# Patient Record
Sex: MALE | Race: WHITE
[De-identification: names, ages, dates, MRNs, and addresses within clinical notes are randomized per-mention and may not be internally consistent; named-entity substitution may affect disease eponyms.]

---

## 2017-02-02 ENCOUNTER — APPOINTMENT (OUTPATIENT)
Dept: CARDIOLOGY | Facility: CLINIC | Age: 57
End: 2017-02-02

## 2017-05-16 ENCOUNTER — APPOINTMENT (OUTPATIENT)
Dept: CARDIOLOGY | Facility: CLINIC | Age: 57
End: 2017-05-16

## 2017-06-01 ENCOUNTER — APPOINTMENT (OUTPATIENT)
Dept: CARDIOLOGY | Facility: CLINIC | Age: 57
End: 2017-06-01

## 2017-10-24 ENCOUNTER — RECORD ABSTRACTING (OUTPATIENT)
Age: 57
End: 2017-10-24

## 2017-10-24 DIAGNOSIS — Z98.890 OTHER SPECIFIED POSTPROCEDURAL STATES: ICD-10-CM

## 2017-10-24 DIAGNOSIS — Z82.49 FAMILY HISTORY OF ISCHEMIC HEART DISEASE AND OTHER DISEASES OF THE CIRCULATORY SYSTEM: ICD-10-CM

## 2017-10-24 DIAGNOSIS — Z78.9 OTHER SPECIFIED HEALTH STATUS: ICD-10-CM

## 2017-10-24 RX ORDER — ASPIRIN 325 MG/1
325 TABLET, FILM COATED ORAL DAILY
Refills: 0 | Status: ACTIVE | COMMUNITY

## 2017-10-24 RX ORDER — OLANZAPINE 5 MG/1
5 TABLET ORAL DAILY
Refills: 0 | Status: ACTIVE | COMMUNITY

## 2017-10-24 RX ORDER — SERTRALINE HYDROCHLORIDE 100 MG/1
100 TABLET, FILM COATED ORAL DAILY
Refills: 0 | Status: ACTIVE | COMMUNITY

## 2017-10-24 RX ORDER — METFORMIN HYDROCHLORIDE 500 MG/1
500 TABLET, FILM COATED, EXTENDED RELEASE ORAL
Refills: 0 | Status: ACTIVE | COMMUNITY

## 2017-12-07 ENCOUNTER — APPOINTMENT (OUTPATIENT)
Dept: CARDIOLOGY | Facility: CLINIC | Age: 57
End: 2017-12-07
Payer: MEDICARE

## 2017-12-07 VITALS
WEIGHT: 211 LBS | OXYGEN SATURATION: 95 % | HEIGHT: 70 IN | HEART RATE: 62 BPM | BODY MASS INDEX: 30.21 KG/M2 | DIASTOLIC BLOOD PRESSURE: 68 MMHG | SYSTOLIC BLOOD PRESSURE: 124 MMHG

## 2017-12-07 PROCEDURE — 99214 OFFICE O/P EST MOD 30 MIN: CPT

## 2017-12-07 RX ORDER — METOPROLOL SUCCINATE 25 MG/1
25 TABLET, EXTENDED RELEASE ORAL
Qty: 45 | Refills: 0 | Status: DISCONTINUED | COMMUNITY
Start: 2017-01-03 | End: 2017-12-07

## 2018-06-21 ENCOUNTER — APPOINTMENT (OUTPATIENT)
Dept: CARDIOLOGY | Facility: CLINIC | Age: 58
End: 2018-06-21
Payer: MEDICARE

## 2018-06-21 ENCOUNTER — NON-APPOINTMENT (OUTPATIENT)
Age: 58
End: 2018-06-21

## 2018-06-21 VITALS
WEIGHT: 208 LBS | SYSTOLIC BLOOD PRESSURE: 108 MMHG | HEART RATE: 57 BPM | DIASTOLIC BLOOD PRESSURE: 60 MMHG | BODY MASS INDEX: 29.78 KG/M2 | HEIGHT: 70 IN

## 2018-06-21 PROCEDURE — 99214 OFFICE O/P EST MOD 30 MIN: CPT

## 2018-06-21 RX ORDER — METFORMIN ER 500 MG 500 MG/1
500 TABLET ORAL
Qty: 180 | Refills: 0 | Status: DISCONTINUED | COMMUNITY
Start: 2017-02-21 | End: 2018-06-21

## 2018-08-01 ENCOUNTER — APPOINTMENT (OUTPATIENT)
Dept: CARDIOLOGY | Facility: CLINIC | Age: 58
End: 2018-08-01
Payer: MEDICARE

## 2018-08-01 PROCEDURE — 93979 VASCULAR STUDY: CPT

## 2018-08-01 PROCEDURE — 93880 EXTRACRANIAL BILAT STUDY: CPT

## 2018-08-01 PROCEDURE — A9502: CPT

## 2018-08-01 PROCEDURE — 78452 HT MUSCLE IMAGE SPECT MULT: CPT

## 2018-08-01 PROCEDURE — 93015 CV STRESS TEST SUPVJ I&R: CPT

## 2018-08-01 PROCEDURE — 93306 TTE W/DOPPLER COMPLETE: CPT

## 2018-08-15 ENCOUNTER — APPOINTMENT (OUTPATIENT)
Dept: CARDIOLOGY | Facility: CLINIC | Age: 58
End: 2018-08-15
Payer: MEDICARE

## 2018-08-15 VITALS
WEIGHT: 208 LBS | HEART RATE: 75 BPM | BODY MASS INDEX: 29.78 KG/M2 | HEIGHT: 70 IN | SYSTOLIC BLOOD PRESSURE: 126 MMHG | DIASTOLIC BLOOD PRESSURE: 78 MMHG

## 2018-08-15 PROCEDURE — 99214 OFFICE O/P EST MOD 30 MIN: CPT

## 2019-02-14 ENCOUNTER — APPOINTMENT (OUTPATIENT)
Dept: CARDIOLOGY | Facility: CLINIC | Age: 59
End: 2019-02-14
Payer: MEDICARE

## 2019-02-14 VITALS
WEIGHT: 220 LBS | HEART RATE: 68 BPM | SYSTOLIC BLOOD PRESSURE: 124 MMHG | OXYGEN SATURATION: 100 % | BODY MASS INDEX: 31.5 KG/M2 | HEIGHT: 70 IN | DIASTOLIC BLOOD PRESSURE: 78 MMHG

## 2019-02-14 PROCEDURE — 99214 OFFICE O/P EST MOD 30 MIN: CPT

## 2019-02-14 NOTE — REASON FOR VISIT
[Follow-Up - Clinic] : a clinic follow-up of [FreeTextEntry2] : CAD, 4vCABG 2013, HTN, dyslipidemia, TBI craniotomy 1996

## 2019-02-14 NOTE — HISTORY OF PRESENT ILLNESS
[FreeTextEntry1] : Brice is a 58-year-old male with history of hypertension, dyslipidemia, type 2 diabetes, angina, CAD 4vCABG 2013, traumatic brain injury craniotomy 1996.\par \par Cardiovascular review of symptoms is negative for exertional chest pain, dyspnea, palpitations, dizziness or syncope. No PND or orthopnea leg edema. No bleeding or black stool.\par \par Brice is physically active walking  30 minutes without exertional chest pain\par \par Exercise Myoview stress test August 2008 LV of 62%, normal perfusion, nonischemic EKG response, no angina, 82% MPHR, 10 minutes 40 seconds Nagi protocol\par \par 2-D echo August 2018, LVEF 55%, moderate diastolic dysfunction, mild MR and TR, normal PASP\par \par Carotid and abdominal ultrasound August 2018, nonobstructive plaque, normal abdominal aortic size\par \par Nov 2010 patient had bradycardia metoprolol was discontinued and low dose Ramipril was continued. Followup BP was 92/60 and Ramipril with be discontinued. Home BP are at goal <130/90. \par \par 2-D echo May 2017 LVEF 60%, mild diastolic dysfunction, trace MR and PI, normal PASP\par \par Carotid and abdominal ultrasound May 2017 nonobstructive, normal aortic size\par \par Stress echo 8-18-15 11:00 88%, negative for exercise induced ischemia, septal motion c/w CABG, PASP wnl\par \par 2DEcho January 2016 suboptimal study LVEF 60%, trace mild MR and TR. \par \par Lab 2/1/2017, CBC and BMP normal range, TSH LFT normal range, total cholesterol 131, TG 70, HDL 54, LDL 63\par

## 2019-02-14 NOTE — PHYSICAL EXAM
[General Appearance - Well Developed] : well developed [Normal Appearance] : normal appearance [Well Groomed] : well groomed [General Appearance - Well Nourished] : well nourished [No Deformities] : no deformities [General Appearance - In No Acute Distress] : no acute distress [Normal Conjunctiva] : the conjunctiva exhibited no abnormalities [Eyelids - No Xanthelasma] : the eyelids demonstrated no xanthelasmas [Normal Oral Mucosa] : normal oral mucosa [No Oral Pallor] : no oral pallor [No Oral Cyanosis] : no oral cyanosis [Normal Jugular Venous A Waves Present] : normal jugular venous A waves present [Normal Jugular Venous V Waves Present] : normal jugular venous V waves present [No Jugular Venous Romo A Waves] : no jugular venous romo A waves [Respiration, Rhythm And Depth] : normal respiratory rhythm and effort [Exaggerated Use Of Accessory Muscles For Inspiration] : no accessory muscle use [Auscultation Breath Sounds / Voice Sounds] : lungs were clear to auscultation bilaterally [Heart Rate And Rhythm] : heart rate and rhythm were normal [Heart Sounds] : normal S1 and S2 [Murmurs] : no murmurs present [Abdomen Soft] : soft [Abdomen Tenderness] : non-tender [Abdomen Mass (___ Cm)] : no abdominal mass palpated [Abnormal Walk] : normal gait [Gait - Sufficient For Exercise Testing] : the gait was sufficient for exercise testing [Nail Clubbing] : no clubbing of the fingernails [Cyanosis, Localized] : no localized cyanosis [Petechial Hemorrhages (___cm)] : no petechial hemorrhages [] : no ischemic changes [Oriented To Time, Place, And Person] : oriented to person, place, and time [Affect] : the affect was normal [Mood] : the mood was normal [No Anxiety] : not feeling anxious

## 2019-02-14 NOTE — DISCUSSION/SUMMARY
[FreeTextEntry1] : Brice is a 58-year-old male  with medical history detailed above and active medical issues including:\par \par - Dyspnea on exertion, normal perfusion MPI stress test normal LVEF August 2018\par \par -CAD 4vCABG 2013 stable on aspirin\par \par -Hypertension BP at goal <130/80 off all BP meds for hypotnesion/bradycardia\par \par -Dyslipidemia on Lipitor well tolerated\par \par -Type 2 diabetes on metformin\par \par -Chronic stable angina\par \par -Tramatic brain injury craniotomy 1996\par \par Advised patient to follow active lifestyle with regular cardiovascular exercise. Patient educated on lifestyle and diet modification with antidiabetic, low sodium low fat diet and avoidance of excessive alcohol. Patient is aware to call with any symptoms or concerns. \par \par Patient will be seen in cardiology follow-up 6 months. Patient will have 2-D echocardiogram to assess for  LV systolic function, wall motion and  structural heart disease. Current cardiac medications remain unchanged. Repeat labs will be ordered with PMD.\par \par Brice will follow up with Dr Estrella Trujillo for primary care.\par \par \par

## 2019-08-06 ENCOUNTER — APPOINTMENT (OUTPATIENT)
Dept: CARDIOLOGY | Facility: CLINIC | Age: 59
End: 2019-08-06
Payer: MEDICARE

## 2019-08-06 PROCEDURE — 93306 TTE W/DOPPLER COMPLETE: CPT

## 2019-08-15 ENCOUNTER — NON-APPOINTMENT (OUTPATIENT)
Age: 59
End: 2019-08-15

## 2019-08-15 ENCOUNTER — APPOINTMENT (OUTPATIENT)
Age: 59
End: 2019-08-15
Payer: MEDICARE

## 2019-08-15 VITALS
HEIGHT: 70 IN | BODY MASS INDEX: 31.5 KG/M2 | SYSTOLIC BLOOD PRESSURE: 120 MMHG | WEIGHT: 220 LBS | DIASTOLIC BLOOD PRESSURE: 62 MMHG | HEART RATE: 65 BPM | OXYGEN SATURATION: 98 %

## 2019-08-15 PROCEDURE — 93000 ELECTROCARDIOGRAM COMPLETE: CPT

## 2019-08-15 PROCEDURE — 99214 OFFICE O/P EST MOD 30 MIN: CPT

## 2019-08-15 NOTE — DISCUSSION/SUMMARY
[FreeTextEntry1] : Brice is a 58-year-old male  with medical history detailed above and active medical issues including:\par \par - Dyspnea on exertion, normal perfusion MPI stress test normal LVEF August 2018\par \par -CAD 4vCABG 2013 stable on aspirin\par \par -Hypertension BP at goal <130/80 off all BP meds for hypotnesion/bradycardia\par \par -Dyslipidemia on Lipitor well tolerated\par \par -Type 2 diabetes on metformin\par \par -Chronic stable angina\par \par -Tramatic brain injury craniotomy 1996\par \par Advised patient to follow active lifestyle with regular cardiovascular exercise. Patient educated on lifestyle and diet modification with antidiabetic, low sodium low fat diet and avoidance of excessive alcohol. Patient is aware to call with any symptoms or concerns. \par \par Patient will be seen in cardiology follow-up 6 months.  Current cardiac medications remain unchanged. Repeat labs will be ordered with PMD.\par \par Brice will follow up with Dr Estrella Trujillo for primary care.\par \par \par

## 2019-08-15 NOTE — HISTORY OF PRESENT ILLNESS
[FreeTextEntry1] : Brice is a 58-year-old male with history of hypertension, dyslipidemia, type 2 diabetes, angina, CAD 4vCABG 2013, traumatic brain injury craniotomy 1996.\par \par Cardiovascular review of symptoms is negative for exertional chest pain, dyspnea, palpitations, dizziness or syncope. No PND or orthopnea leg edema. No bleeding or black stool.\par \par Brice is physically active walking  30 minutes without exertional chest pain\par \par Exercise Myoview stress test August 2008 LV of 62%, normal perfusion, nonischemic EKG response, no angina, 82% MPHR, 10 minutes 40 seconds Nagi protocol\par \par 2-D echo August 2018, LVEF 55%, moderate diastolic dysfunction, mild MR and TR, normal PASP\par \par Carotid and abdominal ultrasound August 2018, nonobstructive plaque, normal abdominal aortic size\par \par Nov 2010 patient had bradycardia metoprolol was discontinued and low dose Ramipril was continued. Followup BP was 92/60 and Ramipril with be discontinued. Home BP are at goal <130/90. \par \par Echocardiogram August 2019, LVEF 65%, normal diastolic function, mild MR, AI and PI, normal RVSP\par \par 2-D echo May 2017 LVEF 60%, mild diastolic dysfunction, trace MR and PI, normal PASP\par \par Carotid and abdominal ultrasound May 2017 nonobstructive, normal aortic size\par \par Stress echo 8-18-15 11:00 88%, negative for exercise induced ischemia, septal motion c/w CABG, PASP wnl\par \par 2DEcho January 2016 suboptimal study LVEF 60%, trace mild MR and TR. \par \par Lab 2/1/2017, CBC and BMP normal range, TSH LFT normal range, total cholesterol 131, TG 70, HDL 54, LDL 63\par

## 2019-08-15 NOTE — PHYSICAL EXAM
[General Appearance - Well Developed] : well developed [Normal Appearance] : normal appearance [Well Groomed] : well groomed [General Appearance - Well Nourished] : well nourished [No Deformities] : no deformities [General Appearance - In No Acute Distress] : no acute distress [Normal Conjunctiva] : the conjunctiva exhibited no abnormalities [Eyelids - No Xanthelasma] : the eyelids demonstrated no xanthelasmas [Normal Oral Mucosa] : normal oral mucosa [No Oral Pallor] : no oral pallor [No Oral Cyanosis] : no oral cyanosis [Normal Jugular Venous A Waves Present] : normal jugular venous A waves present [Normal Jugular Venous V Waves Present] : normal jugular venous V waves present [No Jugular Venous Romo A Waves] : no jugular venous romo A waves [Respiration, Rhythm And Depth] : normal respiratory rhythm and effort [Exaggerated Use Of Accessory Muscles For Inspiration] : no accessory muscle use [Heart Rate And Rhythm] : heart rate and rhythm were normal [Auscultation Breath Sounds / Voice Sounds] : lungs were clear to auscultation bilaterally [Heart Sounds] : normal S1 and S2 [Murmurs] : no murmurs present [Abdomen Soft] : soft [Abdomen Tenderness] : non-tender [Abdomen Mass (___ Cm)] : no abdominal mass palpated [Abnormal Walk] : normal gait [Gait - Sufficient For Exercise Testing] : the gait was sufficient for exercise testing [Nail Clubbing] : no clubbing of the fingernails [Cyanosis, Localized] : no localized cyanosis [Petechial Hemorrhages (___cm)] : no petechial hemorrhages [] : no ischemic changes [Oriented To Time, Place, And Person] : oriented to person, place, and time [Affect] : the affect was normal [Mood] : the mood was normal [No Anxiety] : not feeling anxious

## 2019-09-30 ENCOUNTER — RX RENEWAL (OUTPATIENT)
Age: 59
End: 2019-09-30

## 2020-02-12 ENCOUNTER — APPOINTMENT (OUTPATIENT)
Dept: CARDIOLOGY | Facility: CLINIC | Age: 60
End: 2020-02-12
Payer: MEDICARE

## 2020-02-12 VITALS
HEIGHT: 70 IN | BODY MASS INDEX: 30.78 KG/M2 | HEART RATE: 59 BPM | OXYGEN SATURATION: 98 % | DIASTOLIC BLOOD PRESSURE: 70 MMHG | WEIGHT: 215 LBS | SYSTOLIC BLOOD PRESSURE: 120 MMHG

## 2020-02-12 PROCEDURE — 99214 OFFICE O/P EST MOD 30 MIN: CPT

## 2020-02-12 NOTE — DISCUSSION/SUMMARY
[FreeTextEntry1] : Brice is a 59-year-old male  with medical history detailed above and active medical issues including:\par \par - Dyspnea on exertion, normal perfusion MPI stress test normal LVEF August 2018\par \par -CAD 4vCABG 2013 stable on aspirin\par \par -Hypertension BP at goal <130/80 off all BP meds for hypotnesion/bradycardia\par \par -Dyslipidemia on Lipitor well tolerated\par \par -Type 2 diabetes on metformin\par \par -Chronic stable angina\par \par -Tramatic brain injury craniotomy 1996\par \par Advised patient to follow active lifestyle with regular cardiovascular exercise. Patient educated on lifestyle and diet modification with antidiabetic, low sodium low fat diet and avoidance of excessive alcohol. Patient is aware to call with any symptoms or concerns. \par \par Patient will be seen in cardiology follow-up 6 months. Patient will have 2-D echocardiogram to assess for  LV systolic function, wall motion and  structural heart disease.  Carotid and abdominal ultrasound to assess for obstructive PAD.   Current cardiac medications remain unchanged. Repeat labs will be ordered with PMD.\par \par Brice will follow up with Dr Estrella Trujillo for primary care.\par \par \par

## 2020-02-12 NOTE — HISTORY OF PRESENT ILLNESS
[FreeTextEntry1] : Brice is a 59-year-old male with history of hypertension, dyslipidemia, type 2 diabetes, angina, CAD 4vCABG 2013, traumatic brain injury craniotomy 1996.\par \par Cardiovascular review of symptoms is negative for exertional chest pain, dyspnea, palpitations, dizziness or syncope. No PND or orthopnea leg edema. No bleeding or black stool.\par \par Brice is physically active walking  30 minutes without exertional chest pain\par \par Exercise Myoview stress test August 2008 LV of 62%, normal perfusion, nonischemic EKG response, no angina, 82% MPHR, 10 minutes 40 seconds Nagi protocol\par \par 2-D echo August 2018, LVEF 55%, moderate diastolic dysfunction, mild MR and TR, normal PASP\par \par Carotid and abdominal ultrasound August 2018, nonobstructive plaque, normal abdominal aortic size\par \par Nov 2010 patient had bradycardia metoprolol was discontinued and low dose Ramipril was continued. Followup BP was 92/60 and Ramipril with be discontinued. Home BP are at goal <130/90. \par \par Echocardiogram August 2019, LVEF 65%, normal diastolic function, mild MR, AI and PI, normal RVSP\par \par 2-D echo May 2017 LVEF 60%, mild diastolic dysfunction, trace MR and PI, normal PASP\par \par Carotid and abdominal ultrasound May 2017 nonobstructive, normal aortic size\par \par Stress echo 8-18-15 11:00 88%, negative for exercise induced ischemia, septal motion c/w CABG, PASP wnl\par \par 2DEcho January 2016 suboptimal study LVEF 60%, trace mild MR and TR. \par \par Lab 2/1/2017, CBC and BMP normal range, TSH LFT normal range, total cholesterol 131, TG 70, HDL 54, LDL 63\par

## 2020-02-12 NOTE — PHYSICAL EXAM
[General Appearance - Well Developed] : well developed [Normal Appearance] : normal appearance [Well Groomed] : well groomed [General Appearance - Well Nourished] : well nourished [No Deformities] : no deformities [General Appearance - In No Acute Distress] : no acute distress [Normal Conjunctiva] : the conjunctiva exhibited no abnormalities [Eyelids - No Xanthelasma] : the eyelids demonstrated no xanthelasmas [Normal Oral Mucosa] : normal oral mucosa [No Oral Pallor] : no oral pallor [No Oral Cyanosis] : no oral cyanosis [Normal Jugular Venous A Waves Present] : normal jugular venous A waves present [Normal Jugular Venous V Waves Present] : normal jugular venous V waves present [No Jugular Venous Romo A Waves] : no jugular venous romo A waves [Respiration, Rhythm And Depth] : normal respiratory rhythm and effort [Exaggerated Use Of Accessory Muscles For Inspiration] : no accessory muscle use [Heart Rate And Rhythm] : heart rate and rhythm were normal [Auscultation Breath Sounds / Voice Sounds] : lungs were clear to auscultation bilaterally [Heart Sounds] : normal S1 and S2 [Murmurs] : no murmurs present [Abdomen Soft] : soft [Abdomen Tenderness] : non-tender [Abdomen Mass (___ Cm)] : no abdominal mass palpated [Abnormal Walk] : normal gait [Gait - Sufficient For Exercise Testing] : the gait was sufficient for exercise testing [Cyanosis, Localized] : no localized cyanosis [Nail Clubbing] : no clubbing of the fingernails [Petechial Hemorrhages (___cm)] : no petechial hemorrhages [] : no ischemic changes [Oriented To Time, Place, And Person] : oriented to person, place, and time [Affect] : the affect was normal [Mood] : the mood was normal [No Anxiety] : not feeling anxious

## 2020-09-08 ENCOUNTER — APPOINTMENT (OUTPATIENT)
Dept: CARDIOLOGY | Facility: CLINIC | Age: 60
End: 2020-09-08
Payer: MEDICARE

## 2020-09-08 PROCEDURE — 93306 TTE W/DOPPLER COMPLETE: CPT

## 2020-09-08 PROCEDURE — 93979 VASCULAR STUDY: CPT

## 2020-09-08 PROCEDURE — 93880 EXTRACRANIAL BILAT STUDY: CPT

## 2020-09-15 ENCOUNTER — APPOINTMENT (OUTPATIENT)
Dept: CARDIOLOGY | Facility: CLINIC | Age: 60
End: 2020-09-15
Payer: MEDICARE

## 2020-09-15 ENCOUNTER — NON-APPOINTMENT (OUTPATIENT)
Age: 60
End: 2020-09-15

## 2020-09-15 VITALS
TEMPERATURE: 97.3 F | BODY MASS INDEX: 32.21 KG/M2 | DIASTOLIC BLOOD PRESSURE: 74 MMHG | SYSTOLIC BLOOD PRESSURE: 126 MMHG | HEART RATE: 57 BPM | OXYGEN SATURATION: 95 % | WEIGHT: 225 LBS | HEIGHT: 70 IN

## 2020-09-15 PROCEDURE — 93000 ELECTROCARDIOGRAM COMPLETE: CPT

## 2020-09-15 PROCEDURE — 99214 OFFICE O/P EST MOD 30 MIN: CPT

## 2020-09-15 NOTE — HISTORY OF PRESENT ILLNESS
[FreeTextEntry1] : Brice is a 59-year-old male with history of hypertension, dyslipidemia, type 2 diabetes, angina, CAD 4vCABG 2013, traumatic brain injury craniotomy 1996.\par \par Cardiovascular review of symptoms is negative for exertional chest pain, dyspnea, palpitations, dizziness or syncope. No PND or orthopnea leg edema. No bleeding or black stool.\par \par Brice is physically active walking  30 minutes without exertional chest pain\par \par Echocardiogram September 2020, mild diastolic dysfunction, base MR, normal RVSP, ascending aorta 3.6 cm\par \par Carotid and abdominal ultrasound September 2020, moderate nonobstructive plaque, normal abdominal aortic size.\par \par Exercise Myoview stress test August 2008 LV of 62%, normal perfusion, nonischemic EKG response, no angina, 82% MPHR, 10 minutes 40 seconds Nagi protocol\par \par 2-D echo August 2018, LVEF 55%, moderate diastolic dysfunction, mild MR and TR, normal PASP\par \par Carotid and abdominal ultrasound August 2018, nonobstructive plaque, normal abdominal aortic size\par \par Nov 2010 patient had bradycardia metoprolol was discontinued and low dose Ramipril was continued. Followup BP was 92/60 and Ramipril with be discontinued. Home BP are at goal <130/90. \par \par Echocardiogram August 2019, LVEF 65%, normal diastolic function, mild MR, AI and PI, normal RVSP\par \par 2-D echo May 2017 LVEF 60%, mild diastolic dysfunction, trace MR and PI, normal PASP\par \par Carotid and abdominal ultrasound May 2017 nonobstructive, normal aortic size\par \par Stress echo 8-18-15 11:00 88%, negative for exercise induced ischemia, septal motion c/w CABG, PASP wnl\par \par 2DEcho January 2016 suboptimal study LVEF 60%, trace mild MR and TR. \par \par Lab 2/1/2017, CBC and BMP normal range, TSH LFT normal range, total cholesterol 131, TG 70, HDL 54, LDL 63\par

## 2020-09-15 NOTE — REASON FOR VISIT
[Follow-Up - Clinic] : a clinic follow-up of [FreeTextEntry2] : noninvasive testing for CAD, 4vCABG 2013, HTN, dyslipidemia, TBI craniotomy 1996

## 2020-09-15 NOTE — PHYSICAL EXAM
[General Appearance - Well Developed] : well developed [Well Groomed] : well groomed [Normal Appearance] : normal appearance [No Deformities] : no deformities [General Appearance - Well Nourished] : well nourished [General Appearance - In No Acute Distress] : no acute distress [Normal Conjunctiva] : the conjunctiva exhibited no abnormalities [Eyelids - No Xanthelasma] : the eyelids demonstrated no xanthelasmas [No Oral Pallor] : no oral pallor [No Oral Cyanosis] : no oral cyanosis [Normal Oral Mucosa] : normal oral mucosa [Normal Jugular Venous V Waves Present] : normal jugular venous V waves present [Normal Jugular Venous A Waves Present] : normal jugular venous A waves present [No Jugular Venous Romo A Waves] : no jugular venous romo A waves [Exaggerated Use Of Accessory Muscles For Inspiration] : no accessory muscle use [Respiration, Rhythm And Depth] : normal respiratory rhythm and effort [Auscultation Breath Sounds / Voice Sounds] : lungs were clear to auscultation bilaterally [Heart Rate And Rhythm] : heart rate and rhythm were normal [Murmurs] : no murmurs present [Heart Sounds] : normal S1 and S2 [Abdomen Tenderness] : non-tender [Abdomen Soft] : soft [Abdomen Mass (___ Cm)] : no abdominal mass palpated [Abnormal Walk] : normal gait [Gait - Sufficient For Exercise Testing] : the gait was sufficient for exercise testing [Cyanosis, Localized] : no localized cyanosis [Nail Clubbing] : no clubbing of the fingernails [Petechial Hemorrhages (___cm)] : no petechial hemorrhages [] : no ischemic changes [Affect] : the affect was normal [Oriented To Time, Place, And Person] : oriented to person, place, and time [No Anxiety] : not feeling anxious [Mood] : the mood was normal

## 2020-09-15 NOTE — DISCUSSION/SUMMARY
[FreeTextEntry1] : Brice is a 59-year-old male  with medical history detailed above and active medical issues including:\par \par - Dyspnea on exertion, normal perfusion MPI stress test normal LVEF August 2018\par \par - CAD 4vCABG 2013 stable on aspirin\par \par - Hypertension BP at goal <130/80 off all BP meds for hypotnesion/bradycardia\par \par - Dyslipidemia on Lipitor well tolerated\par \par - Type 2 diabetes on metformin\par \par - Chronic stable angina\par \par - Tramatic brain injury craniotomy 1996\par \par Advised patient to follow active lifestyle with regular cardiovascular exercise. Patient educated on lifestyle and diet modification with antidiabetic, low sodium low fat diet and avoidance of excessive alcohol. Patient is aware to call with any symptoms or concerns. \par \par Patient will be seen in cardiology follow-up 6 months.   Current cardiac medications remain unchanged. Repeat labs will be ordered with PMD.\par \par Brice will follow up with Dr Estrella Trujillo for primary care.\par \par \par

## 2021-10-14 ENCOUNTER — NON-APPOINTMENT (OUTPATIENT)
Age: 61
End: 2021-10-14

## 2021-10-14 ENCOUNTER — APPOINTMENT (OUTPATIENT)
Dept: CARDIOLOGY | Facility: CLINIC | Age: 61
End: 2021-10-14
Payer: MEDICARE

## 2021-10-14 VITALS
SYSTOLIC BLOOD PRESSURE: 124 MMHG | TEMPERATURE: 97.2 F | BODY MASS INDEX: 31.5 KG/M2 | HEIGHT: 70 IN | HEART RATE: 57 BPM | WEIGHT: 220 LBS | OXYGEN SATURATION: 97 % | DIASTOLIC BLOOD PRESSURE: 62 MMHG

## 2021-10-14 PROCEDURE — 93000 ELECTROCARDIOGRAM COMPLETE: CPT

## 2021-10-14 PROCEDURE — 99214 OFFICE O/P EST MOD 30 MIN: CPT

## 2021-10-14 NOTE — HISTORY OF PRESENT ILLNESS
[FreeTextEntry1] : Brice is a 60-year-old male with history of hypertension, dyslipidemia, type 2 diabetes, angina, CAD 4vCABG 2013, traumatic brain injury craniotomy 1996.\par \par Cardiovascular review of symptoms is negative for exertional chest pain, dyspnea, palpitations, dizziness or syncope. No PND or orthopnea leg edema. No bleeding or black stool.\par \par Brice is physically active walking  30 minutes without exertional chest pain. Patient does not wish to perform a current stress test at this time, will discuss next office visit.\par \par Echocardiogram September 2020, mild diastolic dysfunction, base MR, normal RVSP, ascending aorta 3.6 cm\par \par Carotid and abdominal ultrasound September 2020, moderate nonobstructive plaque, normal abdominal aortic size.\par \par Exercise Myoview stress test August 2008 LV of 62%, normal perfusion, nonischemic EKG response, no angina, 82% MPHR, 10 minutes 40 seconds Nagi protocol\par \par 2-D echo August 2018, LVEF 55%, moderate diastolic dysfunction, mild MR and TR, normal PASP\par \par Carotid and abdominal ultrasound August 2018, nonobstructive plaque, normal abdominal aortic size\par \par Nov 2010 patient had bradycardia metoprolol was discontinued and low dose Ramipril was continued. Followup BP was 92/60 and Ramipril with be discontinued. Home BP are at goal <130/90. \par \par Echocardiogram August 2019, LVEF 65%, normal diastolic function, mild MR, AI and PI, normal RVSP\par \par 2-D echo May 2017 LVEF 60%, mild diastolic dysfunction, trace MR and PI, normal PASP\par \par Carotid and abdominal ultrasound May 2017 nonobstructive, normal aortic size\par \par Stress echo 8-18-15 11:00 88%, negative for exercise induced ischemia, septal motion c/w CABG, PASP wnl\par \par 2DEcho January 2016 suboptimal study LVEF 60%, trace mild MR and TR. \par \par Lab 2/1/2017, CBC and BMP normal range, TSH LFT normal range, total cholesterol 131, TG 70, HDL 54, LDL 63\par

## 2021-10-14 NOTE — DISCUSSION/SUMMARY
[FreeTextEntry1] : Brice is a 60-year-old male  with medical history detailed above and active medical issues including:\par \par - No anginal symptoms, normal perfusion MPI stress test normal LVEF August 2018\par \par - CAD 4vCABG 2013 stable on aspirin\par \par - Hypertension BP average resting home BPs at guideline goal off all BP meds for hypotnesion/bradycardia\par \par - Dyslipidemia on Lipitor well tolerated\par \par - Type 2 diabetes on metformin\par \par - Chronic stable angina\par \par - Tramatic brain injury craniotomy 1996\par \par Advised patient to follow active lifestyle with regular cardiovascular exercise. Patient educated on lifestyle and diet modification with antidiabetic, low sodium low fat diet and avoidance of excessive alcohol. Patient is aware to call with any symptoms or concerns. \par \par Patient will have 2-D echocardiogram to assess LV systolic function, structural heart disease with TEB followup.  Patient will be seen in cardiology follow-up 6 months.   Current cardiac medications remain unchanged. Repeat labs will be ordered with PMD.\par \par Brice will follow up with Dr Estrella Trujillo for primary care.\par \par \par

## 2021-10-14 NOTE — PHYSICAL EXAM
[General Appearance - Well Developed] : well developed [Normal Appearance] : normal appearance [Well Groomed] : well groomed [General Appearance - Well Nourished] : well nourished [No Deformities] : no deformities [General Appearance - In No Acute Distress] : no acute distress [Eyelids - No Xanthelasma] : the eyelids demonstrated no xanthelasmas [Normal Oral Mucosa] : normal oral mucosa [No Oral Pallor] : no oral pallor [No Oral Cyanosis] : no oral cyanosis [Normal Jugular Venous A Waves Present] : normal jugular venous A waves present [Normal Jugular Venous V Waves Present] : normal jugular venous V waves present [No Jugular Venous Romo A Waves] : no jugular venous romo A waves [Respiration, Rhythm And Depth] : normal respiratory rhythm and effort [Exaggerated Use Of Accessory Muscles For Inspiration] : no accessory muscle use [Auscultation Breath Sounds / Voice Sounds] : lungs were clear to auscultation bilaterally [Heart Rate And Rhythm] : heart rate and rhythm were normal [Heart Sounds] : normal S1 and S2 [Murmurs] : no murmurs present [Abdomen Soft] : soft [Abdomen Tenderness] : non-tender [Abdomen Mass (___ Cm)] : no abdominal mass palpated [Abnormal Walk] : normal gait [Gait - Sufficient For Exercise Testing] : the gait was sufficient for exercise testing [Nail Clubbing] : no clubbing of the fingernails [Cyanosis, Localized] : no localized cyanosis [Petechial Hemorrhages (___cm)] : no petechial hemorrhages [] : no ischemic changes [Oriented To Time, Place, And Person] : oriented to person, place, and time [Affect] : the affect was normal [Mood] : the mood was normal [No Anxiety] : not feeling anxious [Well Developed] : well developed [Well Nourished] : well nourished [No Acute Distress] : no acute distress [Normal Conjunctiva] : normal conjunctiva [Normal Venous Pressure] : normal venous pressure [No Carotid Bruit] : no carotid bruit [Normal S1, S2] : normal S1, S2 [No Murmur] : no murmur [No Rub] : no rub [No Gallop] : no gallop [Clear Lung Fields] : clear lung fields [Good Air Entry] : good air entry [No Respiratory Distress] : no respiratory distress  [Soft] : abdomen soft [Non Tender] : non-tender [No Masses/organomegaly] : no masses/organomegaly [Normal Bowel Sounds] : normal bowel sounds [Normal Gait] : normal gait [No Edema] : no edema [No Cyanosis] : no cyanosis [No Clubbing] : no clubbing [No Varicosities] : no varicosities [No Rash] : no rash [No Skin Lesions] : no skin lesions [Moves all extremities] : moves all extremities [No Focal Deficits] : no focal deficits [Normal Speech] : normal speech [Alert and Oriented] : alert and oriented [Normal memory] : normal memory

## 2022-05-03 ENCOUNTER — APPOINTMENT (OUTPATIENT)
Dept: CARDIOLOGY | Facility: CLINIC | Age: 62
End: 2022-05-03
Payer: MEDICARE

## 2022-05-03 VITALS
HEART RATE: 69 BPM | TEMPERATURE: 96.9 F | SYSTOLIC BLOOD PRESSURE: 112 MMHG | DIASTOLIC BLOOD PRESSURE: 78 MMHG | BODY MASS INDEX: 32.93 KG/M2 | HEIGHT: 70 IN | WEIGHT: 230 LBS | OXYGEN SATURATION: 99 %

## 2022-05-03 PROCEDURE — 99215 OFFICE O/P EST HI 40 MIN: CPT

## 2022-05-03 PROCEDURE — 93306 TTE W/DOPPLER COMPLETE: CPT

## 2022-05-03 NOTE — DISCUSSION/SUMMARY
[FreeTextEntry1] : Brice is a 61-year-old male  with medical history detailed above and active medical issues including:\par \par - No anginal symptoms, normal perfusion MPI stress test normal LVEF August 2018\par \par - CAD 4vCABG 2013 stable on aspirin\par \par - Hypertension BP average resting home BPs at guideline goal off all BP meds for hypotnesion/bradycardia\par \par - Dyslipidemia on Lipitor well tolerated\par \par - Type 2 diabetes on metformin\par \par - Chronic stable angina\par \par - Tramatic brain injury craniotomy 1996\par \par Advised patient to follow active lifestyle with regular cardiovascular exercise. Patient educated on lifestyle and diet modification with antidiabetic, low sodium low fat diet and avoidance of excessive alcohol. Patient is aware to call with any symptoms or concerns. \par \par Patient will be seen in cardiology follow-up 6 months.   Current cardiac medications remain unchanged. Repeat labs will be ordered with PMD.\par \par Brice will follow up with Dr Estrella Trujillo for primary care.\par \par \par

## 2022-05-03 NOTE — PHYSICAL EXAM
[Well Developed] : well developed [Well Nourished] : well nourished [No Acute Distress] : no acute distress [Normal Venous Pressure] : normal venous pressure [No Carotid Bruit] : no carotid bruit [Normal S1, S2] : normal S1, S2 [No Murmur] : no murmur [No Rub] : no rub [No Gallop] : no gallop [Clear Lung Fields] : clear lung fields [Good Air Entry] : good air entry [No Respiratory Distress] : no respiratory distress  [Soft] : abdomen soft [Non Tender] : non-tender [No Masses/organomegaly] : no masses/organomegaly [Normal Bowel Sounds] : normal bowel sounds [Normal Gait] : normal gait [No Edema] : no edema [No Cyanosis] : no cyanosis [No Clubbing] : no clubbing [No Varicosities] : no varicosities [No Rash] : no rash [No Skin Lesions] : no skin lesions [Moves all extremities] : moves all extremities [No Focal Deficits] : no focal deficits [Normal Speech] : normal speech [Alert and Oriented] : alert and oriented [Normal memory] : normal memory [General Appearance - Well Developed] : well developed [Normal Appearance] : normal appearance [Well Groomed] : well groomed [General Appearance - Well Nourished] : well nourished [No Deformities] : no deformities [General Appearance - In No Acute Distress] : no acute distress [Normal Conjunctiva] : the conjunctiva exhibited no abnormalities [Eyelids - No Xanthelasma] : the eyelids demonstrated no xanthelasmas [Normal Oral Mucosa] : normal oral mucosa [No Oral Pallor] : no oral pallor [No Oral Cyanosis] : no oral cyanosis [Normal Jugular Venous A Waves Present] : normal jugular venous A waves present [Normal Jugular Venous V Waves Present] : normal jugular venous V waves present [No Jugular Venous Romo A Waves] : no jugular venous romo A waves [Respiration, Rhythm And Depth] : normal respiratory rhythm and effort [Exaggerated Use Of Accessory Muscles For Inspiration] : no accessory muscle use [Auscultation Breath Sounds / Voice Sounds] : lungs were clear to auscultation bilaterally [Heart Rate And Rhythm] : heart rate and rhythm were normal [Heart Sounds] : normal S1 and S2 [Murmurs] : no murmurs present [Abdomen Soft] : soft [Abdomen Tenderness] : non-tender [Abdomen Mass (___ Cm)] : no abdominal mass palpated [Abnormal Walk] : normal gait [Gait - Sufficient For Exercise Testing] : the gait was sufficient for exercise testing [Nail Clubbing] : no clubbing of the fingernails [Cyanosis, Localized] : no localized cyanosis [Petechial Hemorrhages (___cm)] : no petechial hemorrhages [] : no ischemic changes [Oriented To Time, Place, And Person] : oriented to person, place, and time [Affect] : the affect was normal [Mood] : the mood was normal [No Anxiety] : not feeling anxious

## 2022-05-03 NOTE — HISTORY OF PRESENT ILLNESS
[FreeTextEntry1] : Brice is a 61-year-old male with history of hypertension, dyslipidemia, type 2 diabetes, angina, CAD 4vCABG 2013, traumatic brain injury craniotomy 1996.\par \par Cardiovascular review of symptoms is negative for exertional chest pain, dyspnea, palpitations, dizziness or syncope. No PND or orthopnea leg edema. No bleeding or black stool.\par \par Brice is physically active walking  30 minutes without exertional chest pain. Patient does not wish to perform a current stress test at this time, will discuss next office visit.\par \par Echocardiogram May 2022 LVEF 60%, mild MR.  Normal RVSP\par \par Echocardiogram September 2020, mild diastolic dysfunction, base MR, normal RVSP, ascending aorta 3.6 cm\par \par Carotid and abdominal ultrasound September 2020, moderate nonobstructive plaque, normal abdominal aortic size.\par \par Exercise Myoview stress test August 2008 LV of 62%, normal perfusion, nonischemic EKG response, no angina, 82% MPHR, 10 minutes 40 seconds Nagi protocol\par \par 2-D echo August 2018, LVEF 55%, moderate diastolic dysfunction, mild MR and TR, normal PASP\par \par Carotid and abdominal ultrasound August 2018, nonobstructive plaque, normal abdominal aortic size\par \par Nov 2010 patient had bradycardia metoprolol was discontinued and low dose Ramipril was continued. Followup BP was 92/60 and Ramipril with be discontinued. Home BP are at goal <130/90. \par \par Echocardiogram August 2019, LVEF 65%, normal diastolic function, mild MR, AI and PI, normal RVSP\par \par 2-D echo May 2017 LVEF 60%, mild diastolic dysfunction, trace MR and PI, normal PASP\par \par Carotid and abdominal ultrasound May 2017 nonobstructive, normal aortic size\par \par Stress echo 8-18-15 11:00 88%, negative for exercise induced ischemia, septal motion c/w CABG, PASP wnl\par \par 2DEcho January 2016 suboptimal study LVEF 60%, trace mild MR and TR. \par \par Lab 2/1/2017, CBC and BMP normal range, TSH LFT normal range, total cholesterol 131, TG 70, HDL 54, LDL 63\par

## 2022-07-26 ENCOUNTER — NON-APPOINTMENT (OUTPATIENT)
Age: 62
End: 2022-07-26

## 2022-10-27 ENCOUNTER — APPOINTMENT (OUTPATIENT)
Dept: CARDIOLOGY | Facility: CLINIC | Age: 62
End: 2022-10-27

## 2022-10-27 ENCOUNTER — NON-APPOINTMENT (OUTPATIENT)
Age: 62
End: 2022-10-27

## 2022-10-27 VITALS
WEIGHT: 240 LBS | HEART RATE: 64 BPM | OXYGEN SATURATION: 96 % | SYSTOLIC BLOOD PRESSURE: 130 MMHG | HEIGHT: 70 IN | BODY MASS INDEX: 34.36 KG/M2 | DIASTOLIC BLOOD PRESSURE: 78 MMHG | TEMPERATURE: 97.5 F

## 2022-10-27 PROCEDURE — 93000 ELECTROCARDIOGRAM COMPLETE: CPT

## 2022-10-27 PROCEDURE — 99215 OFFICE O/P EST HI 40 MIN: CPT

## 2022-10-27 NOTE — HISTORY OF PRESENT ILLNESS
[FreeTextEntry1] : Brice is a 62-year-old male with history of hypertension, dyslipidemia, type 2 diabetes, angina, CAD 4vCABG 2013, traumatic brain injury craniotomy 1996.\par \par Cardiovascular review of symptoms is negative for exertional chest pain, dyspnea, palpitations, dizziness or syncope. No PND or orthopnea leg edema. No bleeding or black stool.\par \par Exercise routine.  Patient is walking 15 minutes on occasion.  Patient gained 20 pounds over the past 6 months\par \par Echocardiogram May 2022 LVEF 60%, mild MR.  Normal RVSP\par \par Echocardiogram September 2020, mild diastolic dysfunction, base MR, normal RVSP, ascending aorta 3.6 cm\par \par Carotid and abdominal ultrasound September 2020, moderate nonobstructive plaque, normal abdominal aortic size.\par \par Exercise Myoview stress test August 2008 LV of 62%, normal perfusion, nonischemic EKG response, no angina, 82% MPHR, 10 minutes 40 seconds Nagi protocol\par \par 2-D echo August 2018, LVEF 55%, moderate diastolic dysfunction, mild MR and TR, normal PASP\par \par Carotid and abdominal ultrasound August 2018, nonobstructive plaque, normal abdominal aortic size\par \par Nov 2010 patient had bradycardia metoprolol was discontinued and low dose Ramipril was continued. Followup BP was 92/60 and Ramipril with be discontinued. Home BP are at goal <130/90. \par \par Echocardiogram August 2019, LVEF 65%, normal diastolic function, mild MR, AI and PI, normal RVSP\par \par 2-D echo May 2017 LVEF 60%, mild diastolic dysfunction, trace MR and PI, normal PASP\par \par Carotid and abdominal ultrasound May 2017 nonobstructive, normal aortic size\par \par Stress echo 8-18-15 11:00 88%, negative for exercise induced ischemia, septal motion c/w CABG, PASP wnl\par \par 2DEcho January 2016 suboptimal study LVEF 60%, trace mild MR and TR. \par \par Lab 2/1/2017, CBC and BMP normal range, TSH LFT normal range, total cholesterol 131, TG 70, HDL 54, LDL 63\par

## 2022-10-27 NOTE — DISCUSSION/SUMMARY
[FreeTextEntry1] : Brice is a 62-year-old male  with medical history detailed above and active medical issues including:\par \par -Dyspnea on exertion, multiple CAD risk factors.  Patient will have noninvasive testing with a exercise Myoview stress test to assess for obstructive CAD, exercise-induced arrhythmia,  blood pressure response, echocardiogram for LVEF, wall motion, structural heart disease,  carotid and abdominal ultrasound to assess for obstructive PAD. \par \par - CAD 4vCABG 2013 stable on aspirin\par \par - Hypertension BP average resting home BPs at guideline goal off all BP meds for hypotnesion/bradycardia\par \par - Dyslipidemia on Lipitor well tolerated\par \par - Type 2 diabetes on metformin\par \par - Chronic stable angina\par \par - Tramatic brain injury craniotomy 1996\par \par Advised patient to follow active lifestyle with regular cardiovascular exercise. Patient educated on lifestyle and diet modification with antidiabetic, low sodium low fat diet and avoidance of excessive alcohol. Patient is aware to call with any symptoms or concerns. \par \par \par Patient will be seen in cardiology follow-up after noninvasive testing.   Current cardiac medications remain unchanged. Repeat labs will be ordered with PMD.  Echocardiogram ordered to evaluate for structural heart disease, carotid and abdominal ultrasound to evaluate for PAD will be done same day office visit in 6 months\par \par Brice will follow up with Dr Estrella Trujillo for primary care.\par \par \par

## 2022-11-29 ENCOUNTER — APPOINTMENT (OUTPATIENT)
Dept: CARDIOLOGY | Facility: CLINIC | Age: 62
End: 2022-11-29
Payer: MEDICARE

## 2022-11-29 PROCEDURE — 78452 HT MUSCLE IMAGE SPECT MULT: CPT

## 2022-11-29 PROCEDURE — 93015 CV STRESS TEST SUPVJ I&R: CPT

## 2022-11-29 PROCEDURE — A9502: CPT

## 2022-12-06 ENCOUNTER — APPOINTMENT (OUTPATIENT)
Dept: CARDIOLOGY | Facility: CLINIC | Age: 62
End: 2022-12-06

## 2022-12-06 NOTE — DISCUSSION/SUMMARY
[FreeTextEntry1] : Brice is a 62-year-old male  with medical history detailed above and active medical issues including:\par \par -Dyspnea on exertion, multiple CAD risk factors.  Normal perfusion Myoview stress test with normal LVEF Nov 2022\par \par - CAD 4vCABG 2013 stable on aspirin\par \par - Hypertension BP average resting home BPs at guideline goal off all BP meds for hypotnesion/bradycardia\par \par - Dyslipidemia on Lipitor well tolerated\par \par - Type 2 diabetes on metformin\par \par - Chronic stable angina\par \par - Tramatic brain injury craniotomy 1996\par \par Advised patient to follow active lifestyle with regular cardiovascular exercise. Patient educated on lifestyle and diet modification with antidiabetic, low sodium low fat diet and avoidance of excessive alcohol. Patient is aware to call with any symptoms or concerns. \par \par Cardiology follow-up 6 months.  Current cardiac medications remain unchanged and renewals  are up to date. Repeat labs will be ordered with PMD.\par \par Brice will follow up with Dr Estrella Trujillo for primary care.\par \par \par

## 2022-12-28 RX ORDER — ATORVASTATIN CALCIUM 40 MG/1
40 TABLET, FILM COATED ORAL
Qty: 90 | Refills: 3 | Status: ACTIVE | COMMUNITY
Start: 1900-01-01 | End: 1900-01-01

## 2023-05-08 NOTE — PHYSICAL EXAM
[Well Developed] : well developed [Well Nourished] : well nourished [No Acute Distress] : no acute distress [Normal Venous Pressure] : normal venous pressure [No Carotid Bruit] : no carotid bruit [Normal S1, S2] : normal S1, S2 [No Murmur] : no murmur [No Rub] : no rub [No Gallop] : no gallop [Clear Lung Fields] : clear lung fields [Good Air Entry] : good air entry [No Respiratory Distress] : no respiratory distress  [Soft] : abdomen soft [Non Tender] : non-tender [No Masses/organomegaly] : no masses/organomegaly [Normal Bowel Sounds] : normal bowel sounds [Normal Gait] : normal gait [No Edema] : no edema [No Cyanosis] : no cyanosis [No Clubbing] : no clubbing [No Varicosities] : no varicosities [No Rash] : no rash [No Skin Lesions] : no skin lesions [Moves all extremities] : moves all extremities [No Focal Deficits] : no focal deficits [Normal Speech] : normal speech [Alert and Oriented] : alert and oriented [Normal memory] : normal memory [General Appearance - Well Developed] : well developed [Normal Appearance] : normal appearance [Well Groomed] : well groomed [General Appearance - Well Nourished] : well nourished [No Deformities] : no deformities [General Appearance - In No Acute Distress] : no acute distress [Normal Conjunctiva] : the conjunctiva exhibited no abnormalities [Eyelids - No Xanthelasma] : the eyelids demonstrated no xanthelasmas [Normal Oral Mucosa] : normal oral mucosa [No Oral Pallor] : no oral pallor [No Oral Cyanosis] : no oral cyanosis [Normal Jugular Venous A Waves Present] : normal jugular venous A waves present [Normal Jugular Venous V Waves Present] : normal jugular venous V waves present [No Jugular Venous Romo A Waves] : no jugular venous romo A waves [Respiration, Rhythm And Depth] : normal respiratory rhythm and effort [Exaggerated Use Of Accessory Muscles For Inspiration] : no accessory muscle use [Auscultation Breath Sounds / Voice Sounds] : lungs were clear to auscultation bilaterally [Heart Rate And Rhythm] : heart rate and rhythm were normal [Heart Sounds] : normal S1 and S2 [Murmurs] : no murmurs present [Abdomen Soft] : soft [Abdomen Tenderness] : non-tender [Abnormal Walk] : normal gait [Abdomen Mass (___ Cm)] : no abdominal mass palpated [Gait - Sufficient For Exercise Testing] : the gait was sufficient for exercise testing [Nail Clubbing] : no clubbing of the fingernails [Cyanosis, Localized] : no localized cyanosis [Petechial Hemorrhages (___cm)] : no petechial hemorrhages [Oriented To Time, Place, And Person] : oriented to person, place, and time [] : no ischemic changes [Affect] : the affect was normal [Mood] : the mood was normal [No Anxiety] : not feeling anxious

## 2023-05-12 ENCOUNTER — APPOINTMENT (OUTPATIENT)
Dept: CARDIOLOGY | Facility: CLINIC | Age: 63
End: 2023-05-12
Payer: MEDICARE

## 2023-05-12 PROCEDURE — 93880 EXTRACRANIAL BILAT STUDY: CPT

## 2023-05-12 PROCEDURE — 93979 VASCULAR STUDY: CPT

## 2023-05-12 PROCEDURE — 93306 TTE W/DOPPLER COMPLETE: CPT

## 2023-05-15 ENCOUNTER — APPOINTMENT (OUTPATIENT)
Dept: CARDIOLOGY | Facility: CLINIC | Age: 63
End: 2023-05-15
Payer: MEDICARE

## 2023-05-15 VITALS
SYSTOLIC BLOOD PRESSURE: 120 MMHG | DIASTOLIC BLOOD PRESSURE: 80 MMHG | HEART RATE: 67 BPM | WEIGHT: 230 LBS | BODY MASS INDEX: 33 KG/M2 | OXYGEN SATURATION: 93 %

## 2023-05-15 PROCEDURE — 99215 OFFICE O/P EST HI 40 MIN: CPT

## 2023-05-15 NOTE — DISCUSSION/SUMMARY
[FreeTextEntry1] : Brice is a 62-year-old male  with medical history detailed above and active medical issues including:\par \par - No anginal symptoms, multiple CAD risk factors.  Normal perfusion Myoview stress test with normal LVEF Nov 2022\par \par - CAD 4vCABG 2013 stable on aspirin\par \par - Hypertension BP average resting home BPs at guideline goal off all BP meds for hypotnesion/bradycardia\par \par - Dyslipidemia on Lipitor well tolerated\par \par - Type 2 diabetes on metformin\par \par - Chronic stable angina\par \par - Tramatic brain injury craniotomy 1996\par \par Advised patient to follow active lifestyle with regular cardiovascular exercise. Patient educated on lifestyle and diet modification with antidiabetic, low sodium low fat diet and avoidance of excessive alcohol. Patient is aware to call with any symptoms or concerns. \par \par Cardiology follow-up 6 months.  Current cardiac medications remain unchanged and renewals  are up to date. Repeat labs will be ordered with PMD.\par \par Brice will follow up with Dr Estrella Trujillo for primary care.\par \par \par

## 2023-05-15 NOTE — HISTORY OF PRESENT ILLNESS
[FreeTextEntry1] : Brice is a 62-year-old male with history of hypertension, dyslipidemia, type 2 diabetes, angina, CAD 4vCABG 2013, traumatic brain injury craniotomy 1996.\par \par Cardiovascular review of symptoms is negative for exertional chest pain, dyspnea, palpitations, dizziness or syncope. No PND or orthopnea leg edema. No bleeding or black stool.\par \par Exercise routine.  Patient is walking 15 minutes on occasion.  Patient gained 20 pounds over the past 6 months\par \par Echocardiogram May 2023 EF 55 to 60%, mild MR.\par \par Echocardiogram May 2022 LVEF 60%, mild MR.  Normal RVSP\par \par Echocardiogram September 2020, mild diastolic dysfunction, base MR, normal RVSP, ascending aorta 3.6 cm\par \par Carotid and abdominal ultrasound September 2020, moderate nonobstructive plaque, normal abdominal aortic size.\par \par Exercise Myoview stress test August 2008 LV of 62%, normal perfusion, nonischemic EKG response, no angina, 82% MPHR, 10 minutes 40 seconds Nagi protocol\par \par 2-D echo August 2018, LVEF 55%, moderate diastolic dysfunction, mild MR and TR, normal PASP\par \par Carotid and abdominal ultrasound August 2018, nonobstructive plaque, normal abdominal aortic size\par \par Nov 2010 patient had bradycardia metoprolol was discontinued and low dose Ramipril was continued. Followup BP was 92/60 and Ramipril with be discontinued. Home BP are at goal <130/90. \par \par Echocardiogram August 2019, LVEF 65%, normal diastolic function, mild MR, AI and PI, normal RVSP\par \par 2-D echo May 2017 LVEF 60%, mild diastolic dysfunction, trace MR and PI, normal PASP\par \par Carotid and abdominal ultrasound May 2017 nonobstructive, normal aortic size\par \par Stress echo 8-18-15 11:00 88%, negative for exercise induced ischemia, septal motion c/w CABG, PASP wnl\par \par 2DEcho January 2016 suboptimal study LVEF 60%, trace mild MR and TR. \par \par Lab 2/1/2017, CBC and BMP normal range, TSH LFT normal range, total cholesterol 131, TG 70, HDL 54, LDL 63\par

## 2023-08-16 ENCOUNTER — NON-APPOINTMENT (OUTPATIENT)
Age: 63
End: 2023-08-16

## 2023-08-24 ENCOUNTER — NON-APPOINTMENT (OUTPATIENT)
Age: 63
End: 2023-08-24

## 2023-11-08 ENCOUNTER — APPOINTMENT (OUTPATIENT)
Dept: CARDIOLOGY | Facility: CLINIC | Age: 63
End: 2023-11-08
Payer: MEDICARE

## 2023-11-08 VITALS
HEIGHT: 70 IN | OXYGEN SATURATION: 99 % | WEIGHT: 210 LBS | HEART RATE: 64 BPM | BODY MASS INDEX: 30.06 KG/M2 | SYSTOLIC BLOOD PRESSURE: 100 MMHG | DIASTOLIC BLOOD PRESSURE: 62 MMHG

## 2023-11-08 PROCEDURE — 99214 OFFICE O/P EST MOD 30 MIN: CPT

## 2023-11-08 PROCEDURE — 93000 ELECTROCARDIOGRAM COMPLETE: CPT

## 2023-11-08 RX ORDER — LINAGLIPTIN 5 MG/1
5 TABLET, FILM COATED ORAL DAILY
Refills: 0 | Status: ACTIVE | COMMUNITY

## 2024-04-24 PROBLEM — E66.3 OVERWEIGHT: Status: ACTIVE | Noted: 2017-10-24

## 2024-04-24 PROBLEM — I25.810 ATHEROSCLEROSIS OF CORONARY ARTERY BYPASS GRAFT W/O ANGINA PECTORIS: Status: ACTIVE | Noted: 2017-10-24

## 2024-04-24 PROBLEM — I34.1 NONRHEUMATIC MITRAL (VALVE) PROLAPSE: Status: ACTIVE | Noted: 2017-10-24

## 2024-04-24 PROBLEM — R00.1 BRADYCARDIA: Status: ACTIVE | Noted: 2017-10-24

## 2024-04-24 PROBLEM — I10 ESSENTIAL (PRIMARY) HYPERTENSION: Status: ACTIVE | Noted: 2017-10-24

## 2024-04-24 PROBLEM — E78.2 MIXED HYPERLIPIDEMIA: Status: ACTIVE | Noted: 2017-10-24

## 2024-04-24 PROBLEM — Z87.820 PERSONAL HISTORY OF TRAUMATIC BRAIN INJURY: Status: ACTIVE | Noted: 2017-10-24

## 2024-04-24 PROBLEM — I95.89 OTHER IATROGENIC HYPOTENSION: Status: ACTIVE | Noted: 2017-10-24

## 2024-04-24 PROBLEM — S06.9XAA TRAUMATIC BRAIN INJURY: Status: ACTIVE | Noted: 2017-10-24

## 2024-04-24 PROBLEM — I36.1 NONRHEUMATIC TRICUSPID VALVE REGURGITATION: Status: ACTIVE | Noted: 2017-10-24

## 2024-04-24 PROBLEM — I70.0 ATHEROSCLEROSIS OF AORTA: Status: ACTIVE | Noted: 2017-10-24

## 2024-04-24 PROBLEM — E11.9 TYPE 2 DIABETES MELLITUS: Status: ACTIVE | Noted: 2017-10-24

## 2024-05-01 ENCOUNTER — APPOINTMENT (OUTPATIENT)
Dept: CARDIOLOGY | Facility: CLINIC | Age: 64
End: 2024-05-01
Payer: MEDICARE

## 2024-05-01 VITALS
DIASTOLIC BLOOD PRESSURE: 70 MMHG | OXYGEN SATURATION: 94 % | BODY MASS INDEX: 31.21 KG/M2 | HEIGHT: 70 IN | SYSTOLIC BLOOD PRESSURE: 148 MMHG | HEART RATE: 83 BPM | WEIGHT: 218 LBS

## 2024-05-01 DIAGNOSIS — I25.810 ATHEROSCLEROSIS OF CORONARY ARTERY BYPASS GRAFT(S) W/OUT ANGINA PECTORIS: ICD-10-CM

## 2024-05-01 DIAGNOSIS — I10 ESSENTIAL (PRIMARY) HYPERTENSION: ICD-10-CM

## 2024-05-01 DIAGNOSIS — I36.1 NONRHEUMATIC TRICUSPID (VALVE) INSUFFICIENCY: ICD-10-CM

## 2024-05-01 DIAGNOSIS — E11.9 TYPE 2 DIABETES MELLITUS W/OUT COMPLICATIONS: ICD-10-CM

## 2024-05-01 DIAGNOSIS — R00.1 BRADYCARDIA, UNSPECIFIED: ICD-10-CM

## 2024-05-01 DIAGNOSIS — E78.2 MIXED HYPERLIPIDEMIA: ICD-10-CM

## 2024-05-01 DIAGNOSIS — E66.3 OVERWEIGHT: ICD-10-CM

## 2024-05-01 DIAGNOSIS — I70.0 ATHEROSCLEROSIS OF AORTA: ICD-10-CM

## 2024-05-01 DIAGNOSIS — Z87.820 PERSONAL HISTORY OF TRAUMATIC BRAIN INJURY: ICD-10-CM

## 2024-05-01 DIAGNOSIS — I95.89 OTHER HYPOTENSION: ICD-10-CM

## 2024-05-01 DIAGNOSIS — S06.9XAA UNSPECIFIED INTRACRANIAL INJURY WITH LOSS OF CONSCIOUSNESS STATUS UNKNOWN, INITIAL ENCOUNTER: ICD-10-CM

## 2024-05-01 DIAGNOSIS — I34.1 NONRHEUMATIC MITRAL (VALVE) PROLAPSE: ICD-10-CM

## 2024-05-01 PROCEDURE — G2211 COMPLEX E/M VISIT ADD ON: CPT

## 2024-05-01 PROCEDURE — 99215 OFFICE O/P EST HI 40 MIN: CPT

## 2024-05-01 NOTE — HISTORY OF PRESENT ILLNESS
[FreeTextEntry1] : Brice is a 63-year-old male with history of hypertension, dyslipidemia, type 2 diabetes, angina, CAD 4vCABG 2013, traumatic brain injury craniotomy 1996.  Cardiovascular review of symptoms is negative for exertional chest pain, dyspnea, palpitations, dizziness or syncope. No PND or orthopnea leg edema. No bleeding or black stool.  Exercise routine.  Patient is walking 15 minutes on occasion.  Patient gained 20 pounds over the past 6 months  Echocardiogram May 2024 LVEF 60%, mild MR.  Echocardiogram May 2023 EF 55 to 60%, mild MR.  Echocardiogram May 2022 LVEF 60%, mild MR.  Normal RVSP  Echocardiogram September 2020, mild diastolic dysfunction, base MR, normal RVSP, ascending aorta 3.6 cm  Carotid and abdominal ultrasound September 2020, moderate nonobstructive plaque, normal abdominal aortic size.  Exercise Myoview stress test August 2008 LV of 62%, normal perfusion, nonischemic EKG response, no angina, 82% MPHR, 10 minutes 40 seconds Nagi protocol  2-D echo August 2018, LVEF 55%, moderate diastolic dysfunction, mild MR and TR, normal PASP  Carotid and abdominal ultrasound August 2018, nonobstructive plaque, normal abdominal aortic size  Nov 2010 patient had bradycardia metoprolol was discontinued and low dose Ramipril was continued. Followup BP was 92/60 and Ramipril with be discontinued. Home BP are at goal <130/90.   Echocardiogram August 2019, LVEF 65%, normal diastolic function, mild MR, AI and PI, normal RVSP  2-D echo May 2017 LVEF 60%, mild diastolic dysfunction, trace MR and PI, normal PASP  Carotid and abdominal ultrasound May 2017 nonobstructive, normal aortic size  Stress echo 8-18-15 11:00 88%, negative for exercise induced ischemia, septal motion c/w CABG, PASP wnl  2DEcho January 2016 suboptimal study LVEF 60%, trace mild MR and TR.   Lab 2/1/2017, CBC and BMP normal range, TSH LFT normal range, total cholesterol 131, TG 70, HDL 54, LDL 63

## 2024-05-01 NOTE — DISCUSSION/SUMMARY
[FreeTextEntry1] : Patient has medical history detailed above and active medical issues including:  - No anginal symptoms, multiple CAD risk factors.  Normal perfusion Myoview stress test with normal LVEF Nov 2022  - CAD 4vCABG 2013 stable on aspirin  - Hypertension BP average resting home BPs at guideline goal off all BP meds for hypotension and bradycardia  - Dyslipidemia on Lipitor well tolerated  - Type 2 diabetes on metformin  - Chronic stable angina  - Tramatic brain injury craniotomy 1996  Advised patient to follow active lifestyle with regular cardiovascular exercise. Patient educated on lifestyle and diet modification with antidiabetic, low sodium low fat diet and avoidance of excessive alcohol. Patient is aware to call with any symptoms or concerns.   Cardiology follow-up 6 months.  Current cardiac medications remain unchanged and renewals  are up to date. Repeat labs will be ordered with PMD.  Brice will follow up with Dr Estrella Trujillo for primary care.  Total time spent 45 minutes, reviewing of test results, chart information, patient discussion, physical exam and completion of chart documentation.

## 2024-10-10 ENCOUNTER — APPOINTMENT (OUTPATIENT)
Dept: ORTHOPEDIC SURGERY | Facility: CLINIC | Age: 64
End: 2024-10-10

## 2024-10-10 DIAGNOSIS — M94.262 CHONDROMALACIA, LEFT KNEE: ICD-10-CM

## 2024-10-10 DIAGNOSIS — M17.12 UNILATERAL PRIMARY OSTEOARTHRITIS, LEFT KNEE: ICD-10-CM

## 2024-10-10 PROCEDURE — 73560 X-RAY EXAM OF KNEE 1 OR 2: CPT | Mod: LT

## 2024-10-10 PROCEDURE — 73565 X-RAY EXAM OF KNEES: CPT

## 2024-10-10 PROCEDURE — 99203 OFFICE O/P NEW LOW 30 MIN: CPT

## 2024-10-10 RX ORDER — CELECOXIB 200 MG/1
200 CAPSULE ORAL
Qty: 35 | Refills: 0 | Status: ACTIVE | COMMUNITY
Start: 2024-10-10 | End: 1900-01-01

## 2024-11-06 ENCOUNTER — RX RENEWAL (OUTPATIENT)
Age: 64
End: 2024-11-06

## 2024-11-06 ENCOUNTER — NON-APPOINTMENT (OUTPATIENT)
Age: 64
End: 2024-11-06

## 2024-11-06 ENCOUNTER — APPOINTMENT (OUTPATIENT)
Dept: CARDIOLOGY | Facility: CLINIC | Age: 64
End: 2024-11-06
Payer: MEDICARE

## 2024-11-06 VITALS
DIASTOLIC BLOOD PRESSURE: 60 MMHG | HEART RATE: 76 BPM | SYSTOLIC BLOOD PRESSURE: 108 MMHG | HEIGHT: 70 IN | WEIGHT: 215 LBS | BODY MASS INDEX: 30.78 KG/M2 | OXYGEN SATURATION: 97 %

## 2024-11-06 DIAGNOSIS — I34.1 NONRHEUMATIC MITRAL (VALVE) PROLAPSE: ICD-10-CM

## 2024-11-06 DIAGNOSIS — R00.1 BRADYCARDIA, UNSPECIFIED: ICD-10-CM

## 2024-11-06 DIAGNOSIS — Z87.820 PERSONAL HISTORY OF TRAUMATIC BRAIN INJURY: ICD-10-CM

## 2024-11-06 DIAGNOSIS — I25.810 ATHEROSCLEROSIS OF CORONARY ARTERY BYPASS GRAFT(S) W/OUT ANGINA PECTORIS: ICD-10-CM

## 2024-11-06 DIAGNOSIS — E78.2 MIXED HYPERLIPIDEMIA: ICD-10-CM

## 2024-11-06 DIAGNOSIS — I36.1 NONRHEUMATIC TRICUSPID (VALVE) INSUFFICIENCY: ICD-10-CM

## 2024-11-06 DIAGNOSIS — I10 ESSENTIAL (PRIMARY) HYPERTENSION: ICD-10-CM

## 2024-11-06 DIAGNOSIS — I70.0 ATHEROSCLEROSIS OF AORTA: ICD-10-CM

## 2024-11-06 DIAGNOSIS — E66.3 OVERWEIGHT: ICD-10-CM

## 2024-11-06 PROCEDURE — 99215 OFFICE O/P EST HI 40 MIN: CPT

## 2024-11-06 PROCEDURE — G2211 COMPLEX E/M VISIT ADD ON: CPT

## 2024-11-06 RX ORDER — ADHESIVE TAPE 3"X 2.3 YD
50 MCG TAPE, NON-MEDICATED TOPICAL
Refills: 0 | Status: ACTIVE | COMMUNITY

## 2024-11-06 RX ORDER — OLANZAPINE 7.5 MG/1
7.5 TABLET, FILM COATED ORAL DAILY
Refills: 0 | Status: ACTIVE | COMMUNITY

## 2024-11-25 ENCOUNTER — APPOINTMENT (OUTPATIENT)
Dept: ORTHOPEDIC SURGERY | Facility: CLINIC | Age: 64
End: 2024-11-25
Payer: MEDICARE

## 2024-11-25 DIAGNOSIS — M17.12 UNILATERAL PRIMARY OSTEOARTHRITIS, LEFT KNEE: ICD-10-CM

## 2024-11-25 PROCEDURE — 99213 OFFICE O/P EST LOW 20 MIN: CPT

## 2024-12-05 ENCOUNTER — RX RENEWAL (OUTPATIENT)
Age: 64
End: 2024-12-05

## 2024-12-17 RX ORDER — ASPIRIN 81 MG
81 TABLET, DELAYED RELEASE (ENTERIC COATED) ORAL
Qty: 90 | Refills: 1 | Status: ACTIVE | COMMUNITY
Start: 2024-12-17 | End: 1900-01-01

## 2025-01-02 ENCOUNTER — RX RENEWAL (OUTPATIENT)
Age: 65
End: 2025-01-02

## 2025-05-26 LAB — HBA1C MFR BLD HPLC: 5.6

## 2025-05-31 ENCOUNTER — NON-APPOINTMENT (OUTPATIENT)
Age: 65
End: 2025-05-31

## 2025-06-02 ENCOUNTER — APPOINTMENT (OUTPATIENT)
Dept: CARDIOLOGY | Facility: CLINIC | Age: 65
End: 2025-06-02
Payer: MEDICARE

## 2025-06-02 VITALS
SYSTOLIC BLOOD PRESSURE: 122 MMHG | BODY MASS INDEX: 32 KG/M2 | WEIGHT: 223 LBS | HEART RATE: 71 BPM | OXYGEN SATURATION: 96 % | DIASTOLIC BLOOD PRESSURE: 80 MMHG

## 2025-06-02 DIAGNOSIS — S06.9XAA UNSPECIFIED INTRACRANIAL INJURY WITH LOSS OF CONSCIOUSNESS STATUS UNKNOWN, INITIAL ENCOUNTER: ICD-10-CM

## 2025-06-02 DIAGNOSIS — I34.1 NONRHEUMATIC MITRAL (VALVE) PROLAPSE: ICD-10-CM

## 2025-06-02 DIAGNOSIS — I10 ESSENTIAL (PRIMARY) HYPERTENSION: ICD-10-CM

## 2025-06-02 DIAGNOSIS — I71.40 ABDOMINAL AORTIC ANEURYSM, WITHOUT RUPTURE, UNSPECIFIED: ICD-10-CM

## 2025-06-02 DIAGNOSIS — E78.2 MIXED HYPERLIPIDEMIA: ICD-10-CM

## 2025-06-02 DIAGNOSIS — E11.9 TYPE 2 DIABETES MELLITUS W/OUT COMPLICATIONS: ICD-10-CM

## 2025-06-02 DIAGNOSIS — M94.262 CHONDROMALACIA, LEFT KNEE: ICD-10-CM

## 2025-06-02 DIAGNOSIS — I25.810 ATHEROSCLEROSIS OF CORONARY ARTERY BYPASS GRAFT(S) W/OUT ANGINA PECTORIS: ICD-10-CM

## 2025-06-02 DIAGNOSIS — R00.1 BRADYCARDIA, UNSPECIFIED: ICD-10-CM

## 2025-06-02 DIAGNOSIS — I70.0 ATHEROSCLEROSIS OF AORTA: ICD-10-CM

## 2025-06-02 PROCEDURE — 99215 OFFICE O/P EST HI 40 MIN: CPT

## 2025-06-02 PROCEDURE — 93306 TTE W/DOPPLER COMPLETE: CPT

## 2025-06-02 PROCEDURE — 93880 EXTRACRANIAL BILAT STUDY: CPT

## 2025-06-02 PROCEDURE — 93978 VASCULAR STUDY: CPT

## 2025-07-15 ENCOUNTER — APPOINTMENT (OUTPATIENT)
Dept: CARDIOLOGY | Facility: CLINIC | Age: 65
End: 2025-07-15
Payer: MEDICARE

## 2025-07-15 PROCEDURE — 99213 OFFICE O/P EST LOW 20 MIN: CPT | Mod: 93
